# Patient Record
Sex: FEMALE | Race: WHITE | NOT HISPANIC OR LATINO | URBAN - METROPOLITAN AREA
[De-identification: names, ages, dates, MRNs, and addresses within clinical notes are randomized per-mention and may not be internally consistent; named-entity substitution may affect disease eponyms.]

---
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
## 2019-08-07 ENCOUNTER — RX RENEWAL (OUTPATIENT)
Age: 19
End: 2019-08-07

## 2019-08-08 ENCOUNTER — RX RENEWAL (OUTPATIENT)
Age: 19
End: 2019-08-08

## 2019-08-13 ENCOUNTER — RX RENEWAL (OUTPATIENT)
Age: 19
End: 2019-08-13

## 2019-12-16 ENCOUNTER — TRANSCRIPTION ENCOUNTER (OUTPATIENT)
Age: 19
End: 2019-12-16

## 2020-01-20 NOTE — ASU DISCHARGE PLAN (ADULT/PEDIATRIC). - ***IN THE EVENT THAT YOU DEVELOP A COMPLICATION AND YOU ARE UNABLE TO REACH YOUR OWN PHYSICIAN, YOU MAY CONTACT:
Statement Selected
pt transferreed to [address] [facility] infusing without difficulty pt transferred with nurse and transport

## 2020-06-26 ENCOUNTER — TRANSCRIPTION ENCOUNTER (OUTPATIENT)
Age: 20
End: 2020-06-26

## 2020-08-20 ENCOUNTER — TRANSCRIPTION ENCOUNTER (OUTPATIENT)
Age: 20
End: 2020-08-20

## 2020-09-15 ENCOUNTER — TRANSCRIPTION ENCOUNTER (OUTPATIENT)
Age: 20
End: 2020-09-15

## 2020-09-18 ENCOUNTER — TRANSCRIPTION ENCOUNTER (OUTPATIENT)
Age: 20
End: 2020-09-18

## 2020-10-13 PROBLEM — J35.01 CHRONIC TONSILLITIS: Chronic | Status: ACTIVE | Noted: 2017-06-20

## 2020-11-24 ENCOUNTER — APPOINTMENT (OUTPATIENT)
Dept: INTERNAL MEDICINE | Facility: CLINIC | Age: 20
End: 2020-11-24
Payer: COMMERCIAL

## 2020-11-24 VITALS
HEIGHT: 62 IN | WEIGHT: 122 LBS | RESPIRATION RATE: 17 BRPM | SYSTOLIC BLOOD PRESSURE: 120 MMHG | TEMPERATURE: 97.7 F | BODY MASS INDEX: 22.45 KG/M2 | DIASTOLIC BLOOD PRESSURE: 80 MMHG | HEART RATE: 72 BPM | OXYGEN SATURATION: 99 %

## 2020-11-24 DIAGNOSIS — K35.80 UNSPECIFIED ACUTE APPENDICITIS: ICD-10-CM

## 2020-11-24 DIAGNOSIS — J45.901 UNSPECIFIED ASTHMA WITH (ACUTE) EXACERBATION: ICD-10-CM

## 2020-11-24 DIAGNOSIS — Z83.2 FAMILY HISTORY OF DISEASES OF THE BLOOD AND BLOOD-FORMING ORGANS AND CERTAIN DISORDERS INVOLVING THE IMMUNE MECHANISM: ICD-10-CM

## 2020-11-24 PROCEDURE — G0442 ANNUAL ALCOHOL SCREEN 15 MIN: CPT

## 2020-11-24 PROCEDURE — 99385 PREV VISIT NEW AGE 18-39: CPT | Mod: 25

## 2020-11-24 PROCEDURE — G0444 DEPRESSION SCREEN ANNUAL: CPT

## 2020-11-24 RX ORDER — PANTOPRAZOLE 40 MG/1
40 TABLET, DELAYED RELEASE ORAL DAILY
Qty: 90 | Refills: 3 | Status: DISCONTINUED | COMMUNITY
Start: 2019-08-07 | End: 2020-11-24

## 2020-11-24 RX ORDER — HYOSCYAMINE SULFATE 0.12 MG/1
0.12 TABLET SUBLINGUAL EVERY 4 HOURS
Qty: 90 | Refills: 1 | Status: DISCONTINUED | COMMUNITY
Start: 2019-08-13 | End: 2020-11-24

## 2020-11-24 RX ORDER — HYOSCYAMINE SULFATE 0.12 MG/1
0.12 TABLET, ORALLY DISINTEGRATING ORAL
Qty: 90 | Refills: 3 | Status: DISCONTINUED | COMMUNITY
Start: 2019-08-08 | End: 2020-11-24

## 2020-11-24 RX ORDER — LEVONORGESTREL 19.5 MG/1
INTRAUTERINE DEVICE INTRAUTERINE
Refills: 0 | Status: ACTIVE | COMMUNITY

## 2020-11-25 ENCOUNTER — TRANSCRIPTION ENCOUNTER (OUTPATIENT)
Age: 20
End: 2020-11-25

## 2020-11-25 LAB
25(OH)D3 SERPL-MCNC: 40.3 NG/ML
ALBUMIN SERPL ELPH-MCNC: 4.7 G/DL
ALP BLD-CCNC: 57 U/L
ALT SERPL-CCNC: 16 U/L
ANION GAP SERPL CALC-SCNC: 11 MMOL/L
AST SERPL-CCNC: 17 U/L
BASOPHILS # BLD AUTO: 0.06 K/UL
BASOPHILS NFR BLD AUTO: 1.1 %
BILIRUB SERPL-MCNC: 0.3 MG/DL
BUN SERPL-MCNC: 18 MG/DL
CALCIUM SERPL-MCNC: 9.8 MG/DL
CHLORIDE SERPL-SCNC: 109 MMOL/L
CHOLEST SERPL-MCNC: 192 MG/DL
CO2 SERPL-SCNC: 20 MMOL/L
CREAT SERPL-MCNC: 0.92 MG/DL
EOSINOPHIL # BLD AUTO: 0.24 K/UL
EOSINOPHIL NFR BLD AUTO: 4.4 %
ESTIMATED AVERAGE GLUCOSE: 97 MG/DL
GLUCOSE SERPL-MCNC: 88 MG/DL
HBA1C MFR BLD HPLC: 5 %
HCT VFR BLD CALC: 42.3 %
HDLC SERPL-MCNC: 57 MG/DL
HGB BLD-MCNC: 13.7 G/DL
IMM GRANULOCYTES NFR BLD AUTO: 0.2 %
LDLC SERPL CALC-MCNC: 126 MG/DL
LYMPHOCYTES # BLD AUTO: 2.58 K/UL
LYMPHOCYTES NFR BLD AUTO: 47.2 %
MAN DIFF?: NORMAL
MCHC RBC-ENTMCNC: 30.9 PG
MCHC RBC-ENTMCNC: 32.4 GM/DL
MCV RBC AUTO: 95.5 FL
MONOCYTES # BLD AUTO: 0.46 K/UL
MONOCYTES NFR BLD AUTO: 8.4 %
NEUTROPHILS # BLD AUTO: 2.12 K/UL
NEUTROPHILS NFR BLD AUTO: 38.7 %
NONHDLC SERPL-MCNC: 135 MG/DL
PLATELET # BLD AUTO: 229 K/UL
POTASSIUM SERPL-SCNC: 4.6 MMOL/L
PROT SERPL-MCNC: 6.5 G/DL
RBC # BLD: 4.43 M/UL
RBC # FLD: 12.3 %
SODIUM SERPL-SCNC: 140 MMOL/L
TRIGL SERPL-MCNC: 47 MG/DL
TSH SERPL-ACNC: 1.43 UIU/ML
WBC # FLD AUTO: 5.47 K/UL

## 2020-12-01 ENCOUNTER — OUTPATIENT (OUTPATIENT)
Dept: OUTPATIENT SERVICES | Facility: HOSPITAL | Age: 20
LOS: 1 days | End: 2020-12-01
Payer: COMMERCIAL

## 2020-12-01 DIAGNOSIS — Z90.49 ACQUIRED ABSENCE OF OTHER SPECIFIED PARTS OF DIGESTIVE TRACT: Chronic | ICD-10-CM

## 2020-12-01 DIAGNOSIS — N13.70 VESICOURETERAL-REFLUX, UNSPECIFIED: Chronic | ICD-10-CM

## 2020-12-01 DIAGNOSIS — Z11.59 ENCOUNTER FOR SCREENING FOR OTHER VIRAL DISEASES: ICD-10-CM

## 2020-12-01 LAB — SARS-COV-2 RNA SPEC QL NAA+PROBE: SIGNIFICANT CHANGE UP

## 2020-12-01 PROCEDURE — U0003: CPT

## 2020-12-02 DIAGNOSIS — Z11.59 ENCOUNTER FOR SCREENING FOR OTHER VIRAL DISEASES: ICD-10-CM

## 2020-12-10 NOTE — ASU PATIENT PROFILE, ADULT - ATTEMPT TO OOB
"Subjective   Bobbi Ellison is a 29 y.o. female who presents today for follow up    Chief Complaint:  Insomnia, anxiety    History of Present Illness:  Bobbi is a 29-year-old single  female mother of one daughter.  She presents today for her one month follow-up for medication management.  She is clean, dressed in business casual attire.  She appears calm, good eye contact, engaged in appointment.    States everything is \"fine at home \". she is working at [employer] and enjoying her job.  She states \"life is good \", all the bad comes from the relationship with her boyfriend.  She and Shine have been dating for 3 years.  He verbally berates her and has been unfaithful multiple times.  He tells her \"you know something was wrong with you.\" Lorena acknowledges the relationship is not healthy and she verbalizes a desire to be out of the relationship.  When she mentions breaking up, Shine he goes into \"overdrive,\" starts coming over more and spending more time with her daughter.     Sleep is a little better.  She still has problems falling asleep due to \"not being able to get her mind to calm down \" She worries about various things but predominantly her relationship.  She denies nightmares.  Duration of sleep averages 6-7 hours.  Daniel states that she has been overeating daily and has gained about 40 pounds in the last year.The benefits  of a healthy diet and exercise were discussed with patient, especially the positive effects they have on mental health. Patient encouraged to consider lifestyle modification regarding  diet and exercise patterns to maximize results of mental health treatment. Bobbi states her mood is \"a little better.\" PHQ scale is 12.  Most problematic are overeating, poor self-esteem, and fatigue.  Her CHINYERE score was 13.  She finds frequent generalized anxiety,  and catastrophizing most impairing.  Her anxiety and depression make it very difficult for Bobbi to function day-to-day.  " Last visit, Bobbi's PHQ score was 9, she rated anxiety as 8/10.  Hopelessness was rated 5/10 both visits.  Patient states she notices a significant improvement in the OCD tendencies.  Some nights obsessions and compulsions to be 80% improved.  She notices exacerbations of obsessions/compulsions predominantly when she is upset.    Medication treatment options discussed with Lorena.  Potential risks, benefits, side effects discussed with patient.  Patient prefers to maintain current dose of Prozac at 20 mg.  She experiences no negative side effects.  Discussed options of adding BuSpar 5 mg p.o. twice daily for anxiety, or adding trazodone 25 mg p.o. at bedtime to augment for anxiety depression and insomnia.  Prefers adding trazodone 25 mg p.o. at at bedtime.  Propanolol discontinued due to nonuse.  Importance of adding psychotherapy to augment medication treatment and help manage interpersonal conflict.  Pt receptive and agrees to make appointment at checkout.  Lab results from October discussed with patient as well.T uptake mildly elevated at 1.37, MPV slightly elevated at 13.2 vitamin D level of 68.7.  Patient states she has been taking approximately 1000 MCG's of vitamin D daily to improve immune function.  Discussed that vitamin D augmentation can affect thyroid function however she is encouraged to find a PCP for health maintenance purposes    The following portions of the patient's history were reviewed and updated as appropriate: allergies, current medications, past family history, past medical history, past social history, past surgical history and problem list.    Allergy:   No Known Allergies     Current Medications:   Current Outpatient Medications   Medication Sig Dispense Refill   • FLUoxetine (PROzac) 20 MG capsule Take 1 capsule by mouth Daily for 30 days. 30 capsule 0   • norgestimate-ethinyl estradiol (Tri-Sprintec) 0.18/0.215/0.25 MG-35 MCG per tablet Take 1 tablet by mouth.     • propranolol  "(INDERAL) 10 MG tablet Take 1 tablet by mouth 2 (Two) Times a Day As Needed (anxiety) for up to 30 days. 60 tablet 2   • traZODone (DESYREL) 50 MG tablet Take 0.5 tablets by mouth Every Night. 30 tablet 0     No current facility-administered medications for this visit.      Review of Symptoms:    Review of Systems   Constitutional: Positive for activity change and appetite change.   Psychiatric/Behavioral: Positive for decreased concentration, sleep disturbance, depressed mood and stress. The patient is nervous/anxious.    All other systems reviewed and are negative.    Physical Exam:   Blood pressure 118/77, pulse 77, temperature 97.1 °F (36.2 °C), height 172.7 cm (67.99\"), weight 89.2 kg (196 lb 9.6 oz).  Body mass index is 29.9 kg/m².    Appearance: clean, dressed in business-casual  Gait, Station, Strength: posture erect, gait steady    Mental Status Exam:   Hygiene:   good  Cooperation:  Cooperative  Eye Contact:  Good  Psychomotor Behavior:  Appropriate  Affect:  Appropriate  Mood: normal  Hopelessness: 5  Speech:  Normal  Thought Process:  Goal directed  Thought Content:  Normal  Suicidal:  None  Homicidal:  None  Hallucinations:  None  Delusion:  None  Memory:  Intact  Orientation:  Person, Place, Time and Situation  Reliability:  good  Insight:  Poor  Judgement:  Poor  Impulse Control:  Good  Physical/Medical Issues:  No      PHQ-Score Total:  PHQ-9 Total Score: 12    Lab Results:   No visits with results within 1 Month(s) from this visit.   Latest known visit with results is:   Lab on 10/26/2020   Component Date Value Ref Range Status   • TSH 10/26/2020 1.520  0.270 - 4.200 uIU/mL Final   • T Uptake 10/26/2020 1.37* 0.80 - 1.30 TBI Final   • T4, Total 10/26/2020 8.93  4.50 - 11.70 mcg/dL Final    T4 results may be falsely increased if patient taking Biotin.   • WBC 10/26/2020 7.66  3.40 - 10.80 10*3/mm3 Final   • RBC 10/26/2020 3.89  3.77 - 5.28 10*6/mm3 Final   • Hemoglobin 10/26/2020 12.1  12.0 - 15.9 " g/dL Final   • Hematocrit 10/26/2020 37.6  34.0 - 46.6 % Final   • MCV 10/26/2020 96.7  79.0 - 97.0 fL Final   • MCH 10/26/2020 31.1  26.6 - 33.0 pg Final   • MCHC 10/26/2020 32.2  31.5 - 35.7 g/dL Final   • RDW 10/26/2020 12.8  12.3 - 15.4 % Final   • RDW-SD 10/26/2020 45.8  37.0 - 54.0 fl Final   • MPV 10/26/2020 13.2* 6.0 - 12.0 fL Final   • Platelets 10/26/2020 199  140 - 450 10*3/mm3 Final   • Neutrophil % 10/26/2020 67.4  42.7 - 76.0 % Final   • Lymphocyte % 10/26/2020 25.3  19.6 - 45.3 % Final   • Monocyte % 10/26/2020 5.9  5.0 - 12.0 % Final   • Eosinophil % 10/26/2020 0.9  0.3 - 6.2 % Final   • Basophil % 10/26/2020 0.4  0.0 - 1.5 % Final   • Neutrophils, Absolute 10/26/2020 5.16  1.70 - 7.00 10*3/mm3 Final   • Lymphocytes, Absolute 10/26/2020 1.94  0.70 - 3.10 10*3/mm3 Final   • Monocytes, Absolute 10/26/2020 0.45  0.10 - 0.90 10*3/mm3 Final   • Eosinophils, Absolute 10/26/2020 0.07  0.00 - 0.40 10*3/mm3 Final   • Basophils, Absolute 10/26/2020 0.03  0.00 - 0.20 10*3/mm3 Final     Assessment/Plan   Problems Addressed this Visit     None      Visit Diagnoses     CHINYERE (generalized anxiety disorder)    -  Primary    Relevant Medications    traZODone (DESYREL) 50 MG tablet    FLUoxetine (PROzac) 20 MG capsule    Mixed obsessional thoughts and acts        Relevant Medications    traZODone (DESYREL) 50 MG tablet    FLUoxetine (PROzac) 20 MG capsule    Current moderate episode of major depressive disorder, unspecified whether recurrent (CMS/HCC)        Relevant Medications    traZODone (DESYREL) 50 MG tablet    FLUoxetine (PROzac) 20 MG capsule    Insomnia due to other mental disorder        Relevant Medications    traZODone (DESYREL) 50 MG tablet    FLUoxetine (PROzac) 20 MG capsule    Medication management          Diagnoses       Codes Comments    CHINYERE (generalized anxiety disorder)    -  Primary ICD-10-CM: F41.1  ICD-9-CM: 300.02     Mixed obsessional thoughts and acts     ICD-10-CM: F42.2  ICD-9-CM: 300.3      Current moderate episode of major depressive disorder, unspecified whether recurrent (CMS/HCC)     ICD-10-CM: F32.1  ICD-9-CM: 296.22     Insomnia due to other mental disorder     ICD-10-CM: F51.05, F99  ICD-9-CM: 300.9, 327.02     Medication management     ICD-10-CM: Z79.899  ICD-9-CM: V58.69         Visit Diagnoses:    ICD-10-CM ICD-9-CM   1. CHINYERE (generalized anxiety disorder)  F41.1 300.02   2. Mixed obsessional thoughts and acts  F42.2 300.3   3. Current moderate episode of major depressive disorder, unspecified whether recurrent (CMS/HCC)  F32.1 296.22   4. Insomnia due to other mental disorder  F51.05 300.9   5. Medication management  Z79.899 V58.69     TREATMENT PLAN/GOALS:   Short Term  1. Pt will keep all appts for med mgt and psychotherapy  2. Pt will take medications as prescribed and report intolerable side effects  3. Pt will pursue psychotherapy services to help gain coping skill and process trauma    Long term:   1. Pt will exhibit emotional stability  2. Pt will use coping skills to work through depression  3. Pt will manage feeling of anger using anger management strategies    MEDICATION ISSUES:  Discussed medication options and treatment plan of prescribed medication as well as the risks, benefits, and side effects including potential falls, possible impaired driving and metabolic adversities among others. Patient is agreeable to call the office with any worsening of symptoms or onset of side effects. Patient is agreeable to call 911 or go to the nearest ER should he/she begin having SI/HI.     MEDS ORDERED DURING VISIT:  New Medications Ordered This Visit   Medications   • traZODone (DESYREL) 50 MG tablet     Sig: Take 0.5 tablets by mouth Every Night.     Dispense:  30 tablet     Refill:  0   • FLUoxetine (PROzac) 20 MG capsule     Sig: Take 1 capsule by mouth Daily for 30 days.     Dispense:  30 capsule     Refill:  0     Return in about 4 weeks (around 1/7/2021), or if symptoms worsen or fail  to improve.           This document has been electronically signed by EROS Jackson   December 10, 2020 09:42 EST    Part of this note may be an electronic transcription/translation of spoken language to printed text using the Dragon Dictation System.   no

## 2021-02-25 ENCOUNTER — OUTPATIENT (OUTPATIENT)
Dept: OUTPATIENT SERVICES | Facility: HOSPITAL | Age: 21
LOS: 1 days | End: 2021-02-25
Payer: COMMERCIAL

## 2021-02-25 DIAGNOSIS — Z90.49 ACQUIRED ABSENCE OF OTHER SPECIFIED PARTS OF DIGESTIVE TRACT: Chronic | ICD-10-CM

## 2021-02-25 DIAGNOSIS — N13.70 VESICOURETERAL-REFLUX, UNSPECIFIED: Chronic | ICD-10-CM

## 2021-02-25 DIAGNOSIS — Z20.828 CONTACT WITH AND (SUSPECTED) EXPOSURE TO OTHER VIRAL COMMUNICABLE DISEASES: ICD-10-CM

## 2021-02-25 LAB — SARS-COV-2 RNA SPEC QL NAA+PROBE: SIGNIFICANT CHANGE UP

## 2021-02-25 PROCEDURE — U0003: CPT

## 2021-02-25 PROCEDURE — U0005: CPT

## 2021-02-25 PROCEDURE — C9803: CPT

## 2021-02-26 DIAGNOSIS — Z20.828 CONTACT WITH AND (SUSPECTED) EXPOSURE TO OTHER VIRAL COMMUNICABLE DISEASES: ICD-10-CM

## 2022-01-27 ENCOUNTER — APPOINTMENT (OUTPATIENT)
Dept: INTERNAL MEDICINE | Facility: CLINIC | Age: 22
End: 2022-01-27
Payer: COMMERCIAL

## 2022-01-27 VITALS
BODY MASS INDEX: 23.55 KG/M2 | RESPIRATION RATE: 17 BRPM | WEIGHT: 128 LBS | SYSTOLIC BLOOD PRESSURE: 124 MMHG | HEART RATE: 81 BPM | HEIGHT: 62 IN | OXYGEN SATURATION: 99 % | TEMPERATURE: 98.2 F | DIASTOLIC BLOOD PRESSURE: 81 MMHG

## 2022-01-27 DIAGNOSIS — N83.209 UNSPECIFIED OVARIAN CYST, UNSPECIFIED SIDE: ICD-10-CM

## 2022-01-27 DIAGNOSIS — Z28.21 IMMUNIZATION NOT CARRIED OUT BECAUSE OF PATIENT REFUSAL: ICD-10-CM

## 2022-01-27 PROCEDURE — 99395 PREV VISIT EST AGE 18-39: CPT

## 2022-01-28 LAB
25(OH)D3 SERPL-MCNC: 51.5 NG/ML
ALBUMIN SERPL ELPH-MCNC: 5 G/DL
ALP BLD-CCNC: 71 U/L
ALT SERPL-CCNC: 16 U/L
ANION GAP SERPL CALC-SCNC: 15 MMOL/L
AST SERPL-CCNC: 17 U/L
BASOPHILS # BLD AUTO: 0.05 K/UL
BASOPHILS NFR BLD AUTO: 0.6 %
BILIRUB SERPL-MCNC: 0.8 MG/DL
BUN SERPL-MCNC: 11 MG/DL
CALCIUM SERPL-MCNC: 10.1 MG/DL
CHLORIDE SERPL-SCNC: 103 MMOL/L
CHOLEST SERPL-MCNC: 223 MG/DL
CO2 SERPL-SCNC: 22 MMOL/L
CREAT SERPL-MCNC: 0.9 MG/DL
EOSINOPHIL # BLD AUTO: 0.07 K/UL
EOSINOPHIL NFR BLD AUTO: 0.9 %
ESTIMATED AVERAGE GLUCOSE: 97 MG/DL
GLUCOSE SERPL-MCNC: 88 MG/DL
HBA1C MFR BLD HPLC: 5 %
HCT VFR BLD CALC: 43.9 %
HDLC SERPL-MCNC: 81 MG/DL
HGB BLD-MCNC: 14.4 G/DL
IMM GRANULOCYTES NFR BLD AUTO: 0.4 %
LDLC SERPL CALC-MCNC: 132 MG/DL
LYMPHOCYTES # BLD AUTO: 2.14 K/UL
LYMPHOCYTES NFR BLD AUTO: 26.1 %
MAN DIFF?: NORMAL
MCHC RBC-ENTMCNC: 31.3 PG
MCHC RBC-ENTMCNC: 32.8 GM/DL
MCV RBC AUTO: 95.4 FL
MONOCYTES # BLD AUTO: 0.51 K/UL
MONOCYTES NFR BLD AUTO: 6.2 %
NEUTROPHILS # BLD AUTO: 5.39 K/UL
NEUTROPHILS NFR BLD AUTO: 65.8 %
NONHDLC SERPL-MCNC: 142 MG/DL
PLATELET # BLD AUTO: 281 K/UL
POTASSIUM SERPL-SCNC: 4.1 MMOL/L
PROT SERPL-MCNC: 7.2 G/DL
RBC # BLD: 4.6 M/UL
RBC # FLD: 12.9 %
SODIUM SERPL-SCNC: 140 MMOL/L
TRIGL SERPL-MCNC: 48 MG/DL
TSH SERPL-ACNC: 1.06 UIU/ML
WBC # FLD AUTO: 8.19 K/UL

## 2022-07-08 ENCOUNTER — NON-APPOINTMENT (OUTPATIENT)
Age: 22
End: 2022-07-08

## 2022-08-06 ENCOUNTER — NON-APPOINTMENT (OUTPATIENT)
Age: 22
End: 2022-08-06

## 2022-11-01 NOTE — PRE-ANESTHESIA EVALUATION ADULT - HEART RATE (BEATS/MIN)
Impression: Vitreous degeneration, bilateral: H43.813. Plan: Mac OCT: unable OD, thinning OS. Retina flat & intact 360, no holes/breaks/tears. S/S of RD discussed, pt to RTC ASAP if occurs. Monitor. Repeat Mac OCT next visit. 69

## 2022-11-08 ENCOUNTER — NON-APPOINTMENT (OUTPATIENT)
Age: 22
End: 2022-11-08

## 2022-11-27 ENCOUNTER — NON-APPOINTMENT (OUTPATIENT)
Age: 22
End: 2022-11-27

## 2023-03-24 DIAGNOSIS — B00.2 HERPESVIRAL GINGIVOSTOMATITIS AND PHARYNGOTONSILLITIS: ICD-10-CM

## 2023-06-01 ENCOUNTER — LABORATORY RESULT (OUTPATIENT)
Age: 23
End: 2023-06-01

## 2023-06-01 ENCOUNTER — APPOINTMENT (OUTPATIENT)
Dept: INTERNAL MEDICINE | Facility: CLINIC | Age: 23
End: 2023-06-01
Payer: COMMERCIAL

## 2023-06-01 ENCOUNTER — NON-APPOINTMENT (OUTPATIENT)
Age: 23
End: 2023-06-01

## 2023-06-01 VITALS
BODY MASS INDEX: 24.29 KG/M2 | TEMPERATURE: 97.3 F | DIASTOLIC BLOOD PRESSURE: 77 MMHG | HEIGHT: 62 IN | OXYGEN SATURATION: 99 % | WEIGHT: 132 LBS | RESPIRATION RATE: 17 BRPM | HEART RATE: 74 BPM | SYSTOLIC BLOOD PRESSURE: 132 MMHG

## 2023-06-01 DIAGNOSIS — R19.4 CHANGE IN BOWEL HABIT: ICD-10-CM

## 2023-06-01 DIAGNOSIS — R53.82 CHRONIC FATIGUE, UNSPECIFIED: ICD-10-CM

## 2023-06-01 DIAGNOSIS — B97.7 PAPILLOMAVIRUS AS THE CAUSE OF DISEASES CLASSIFIED ELSEWHERE: ICD-10-CM

## 2023-06-01 DIAGNOSIS — R76.8 OTHER SPECIFIED ABNORMAL IMMUNOLOGICAL FINDINGS IN SERUM: ICD-10-CM

## 2023-06-01 DIAGNOSIS — E78.5 HYPERLIPIDEMIA, UNSPECIFIED: ICD-10-CM

## 2023-06-01 DIAGNOSIS — J45.909 UNSPECIFIED ASTHMA, UNCOMPLICATED: ICD-10-CM

## 2023-06-01 DIAGNOSIS — K58.9 IRRITABLE BOWEL SYNDROME W/OUT DIARRHEA: ICD-10-CM

## 2023-06-01 DIAGNOSIS — Z00.00 ENCOUNTER FOR GENERAL ADULT MEDICAL EXAMINATION W/OUT ABNORMAL FINDINGS: ICD-10-CM

## 2023-06-01 DIAGNOSIS — D68.51 ACTIVATED PROTEIN C RESISTANCE: ICD-10-CM

## 2023-06-01 PROCEDURE — 99214 OFFICE O/P EST MOD 30 MIN: CPT | Mod: 25

## 2023-06-01 PROCEDURE — 99395 PREV VISIT EST AGE 18-39: CPT | Mod: 25

## 2023-06-01 RX ORDER — ALBUTEROL SULFATE 90 UG/1
108 (90 BASE) INHALANT RESPIRATORY (INHALATION)
Qty: 1 | Refills: 2 | Status: ACTIVE | COMMUNITY
Start: 1900-01-01 | End: 1900-01-01

## 2023-06-02 DIAGNOSIS — R79.0 ABNORMAL LVL OF BLOOD MINERAL: ICD-10-CM

## 2023-06-02 LAB
25(OH)D3 SERPL-MCNC: 43 NG/ML
ALBUMIN SERPL ELPH-MCNC: 4.6 G/DL
ALP BLD-CCNC: 60 U/L
ALT SERPL-CCNC: 15 U/L
ANA PAT FLD IF-IMP: NORMAL
ANA SER IF-ACNC: ABNORMAL
ANION GAP SERPL CALC-SCNC: 13 MMOL/L
APPEARANCE: CLEAR
AST SERPL-CCNC: 19 U/L
BILIRUB SERPL-MCNC: 1.1 MG/DL
BILIRUBIN URINE: NEGATIVE
BLOOD URINE: NEGATIVE
BUN SERPL-MCNC: 12 MG/DL
CALCIUM SERPL-MCNC: 10.1 MG/DL
CHLORIDE SERPL-SCNC: 103 MMOL/L
CHOLEST SERPL-MCNC: 187 MG/DL
CO2 SERPL-SCNC: 23 MMOL/L
COLOR: YELLOW
CREAT SERPL-MCNC: 0.96 MG/DL
CRP SERPL-MCNC: <3 MG/L
EGFR: 85 ML/MIN/1.73M2
ERYTHROCYTE [SEDIMENTATION RATE] IN BLOOD BY WESTERGREN METHOD: 2 MM/HR
ESTIMATED AVERAGE GLUCOSE: 103 MG/DL
FERRITIN SERPL-MCNC: 87 NG/ML
FOLATE SERPL-MCNC: >20 NG/ML
GLUCOSE QUALITATIVE U: NEGATIVE MG/DL
GLUCOSE SERPL-MCNC: 76 MG/DL
HBA1C MFR BLD HPLC: 5.2 %
HDLC SERPL-MCNC: 71 MG/DL
IRON SATN MFR SERPL: 65 %
IRON SERPL-MCNC: 200 UG/DL
KETONES URINE: NEGATIVE MG/DL
LDLC SERPL CALC-MCNC: 106 MG/DL
LEUKOCYTE ESTERASE URINE: ABNORMAL
NITRITE URINE: NEGATIVE
NONHDLC SERPL-MCNC: 116 MG/DL
PH URINE: 7
POTASSIUM SERPL-SCNC: 4 MMOL/L
PROT SERPL-MCNC: 6.8 G/DL
PROTEIN URINE: NEGATIVE MG/DL
SODIUM SERPL-SCNC: 139 MMOL/L
SPECIFIC GRAVITY URINE: 1.01
TIBC SERPL-MCNC: 307 UG/DL
TRIGL SERPL-MCNC: 50 MG/DL
TSH SERPL-ACNC: 0.66 UIU/ML
UIBC SERPL-MCNC: 107 UG/DL
UROBILINOGEN URINE: 0.2 MG/DL
VIT B12 SERPL-MCNC: 374 PG/ML

## 2023-06-12 ENCOUNTER — APPOINTMENT (OUTPATIENT)
Dept: CARDIOLOGY | Facility: CLINIC | Age: 23
End: 2023-06-12
Payer: COMMERCIAL

## 2023-06-12 ENCOUNTER — NON-APPOINTMENT (OUTPATIENT)
Age: 23
End: 2023-06-12

## 2023-06-12 VITALS
HEART RATE: 71 BPM | BODY MASS INDEX: 23.92 KG/M2 | HEIGHT: 62 IN | SYSTOLIC BLOOD PRESSURE: 112 MMHG | DIASTOLIC BLOOD PRESSURE: 80 MMHG | OXYGEN SATURATION: 99 % | WEIGHT: 130 LBS

## 2023-06-12 DIAGNOSIS — R00.0 TACHYCARDIA, UNSPECIFIED: ICD-10-CM

## 2023-06-12 DIAGNOSIS — R00.2 PALPITATIONS: ICD-10-CM

## 2023-06-12 PROCEDURE — 99204 OFFICE O/P NEW MOD 45 MIN: CPT

## 2023-06-12 PROCEDURE — 93242 EXT ECG>48HR<7D RECORDING: CPT

## 2023-06-12 PROCEDURE — 93000 ELECTROCARDIOGRAM COMPLETE: CPT | Mod: 59

## 2023-06-12 PROCEDURE — ZZZZZ: CPT

## 2023-06-12 NOTE — PHYSICAL EXAM

## 2023-06-12 NOTE — DISCUSSION/SUMMARY
[FreeTextEntry1] : 3-day cardiac monitor to eval for arrhythmia vs ST.\par Reassurance.\par Discussed potential contributors to tachycardia incl lack of adequate hydration, caffeine, and bronchodilator.\par

## 2023-06-12 NOTE — HISTORY OF PRESENT ILLNESS
[FreeTextEntry1] : CLARA WALLACE is a 22 year old female referred for consultation due to tachycardia to over 200 bpm during exercise.  She trains as a distance runner and has noted on her smartwatch that her HR has been routinely over 200 at less than maximal intensity exercise.  No assoc symptoms of chest pain, SOB, or dizziness.  She reports the HR escalation is sometimes abrupt, and not every time she exericses.  She also has had palpitations during these episodes.\par Recent labs reviewed.\par \par She has hist of exercise-induced asthma/allergies and uses Albuterol inhaler pre-exercise.\par \par Soc - no smoking.  + caffeine.\par \par Fam hist - + Factor V leiden mutation.  No early CAD. No SCD.

## 2023-08-01 RX ORDER — VALACYCLOVIR 1 G/1
1 TABLET, FILM COATED ORAL
Qty: 20 | Refills: 3 | Status: ACTIVE | COMMUNITY
Start: 2023-07-19 | End: 1900-01-01

## 2024-04-02 ENCOUNTER — EMERGENCY (EMERGENCY)
Facility: HOSPITAL | Age: 24
LOS: 1 days | Discharge: HOME | End: 2024-04-02
Attending: EMERGENCY MEDICINE | Admitting: EMERGENCY MEDICINE
Payer: COMMERCIAL

## 2024-04-02 VITALS
OXYGEN SATURATION: 98 % | HEART RATE: 97 BPM | SYSTOLIC BLOOD PRESSURE: 157 MMHG | TEMPERATURE: 98 F | DIASTOLIC BLOOD PRESSURE: 89 MMHG | RESPIRATION RATE: 18 BRPM | WEIGHT: 130.07 LBS

## 2024-04-02 DIAGNOSIS — N13.70 VESICOURETERAL-REFLUX, UNSPECIFIED: Chronic | ICD-10-CM

## 2024-04-02 DIAGNOSIS — Z90.49 ACQUIRED ABSENCE OF OTHER SPECIFIED PARTS OF DIGESTIVE TRACT: Chronic | ICD-10-CM

## 2024-04-02 PROCEDURE — 99291 CRITICAL CARE FIRST HOUR: CPT

## 2024-04-02 RX ORDER — EPINEPHRINE 0.3 MG/.3ML
0.3 INJECTION INTRAMUSCULAR; SUBCUTANEOUS
Qty: 1 | Refills: 0
Start: 2024-04-02 | End: 2024-04-02

## 2024-04-02 RX ORDER — FAMOTIDINE 10 MG/ML
20 INJECTION INTRAVENOUS ONCE
Refills: 0 | Status: COMPLETED | OUTPATIENT
Start: 2024-04-02 | End: 2024-04-02

## 2024-04-02 RX ORDER — DIPHENHYDRAMINE HCL 50 MG
25 CAPSULE ORAL ONCE
Refills: 0 | Status: COMPLETED | OUTPATIENT
Start: 2024-04-02 | End: 2024-04-02

## 2024-04-02 RX ORDER — EPINEPHRINE 0.3 MG/.3ML
0.3 INJECTION INTRAMUSCULAR; SUBCUTANEOUS
Qty: 2 | Refills: 0
Start: 2024-04-02 | End: 2024-04-02

## 2024-04-02 RX ORDER — DEXAMETHASONE 0.5 MG/5ML
6 ELIXIR ORAL ONCE
Refills: 0 | Status: COMPLETED | OUTPATIENT
Start: 2024-04-02 | End: 2024-04-02

## 2024-04-02 RX ORDER — SODIUM CHLORIDE 9 MG/ML
1000 INJECTION INTRAMUSCULAR; INTRAVENOUS; SUBCUTANEOUS ONCE
Refills: 0 | Status: COMPLETED | OUTPATIENT
Start: 2024-04-02 | End: 2024-04-02

## 2024-04-02 RX ADMIN — Medication 6 MILLIGRAM(S): at 18:41

## 2024-04-02 RX ADMIN — Medication 25 MILLIGRAM(S): at 18:41

## 2024-04-02 RX ADMIN — SODIUM CHLORIDE 1000 MILLILITER(S): 9 INJECTION INTRAMUSCULAR; INTRAVENOUS; SUBCUTANEOUS at 18:41

## 2024-04-02 RX ADMIN — FAMOTIDINE 20 MILLIGRAM(S): 10 INJECTION INTRAVENOUS at 18:41

## 2024-04-02 NOTE — ED PROVIDER NOTE - PHYSICAL EXAMINATION
General: well developed, well nourished, no distress  Eyes: no scleral injection  ENT:Lip swelling appreciated.  Oropharynx is clear.  Neck: non-tender, full range of motion, supple.   Respiratory: Clear to auscultation bilaterally, no wheezing noted.  Cardiovascular: no central cyanosis  Musculoskeletal: normal gait.   Extremities: normal range of motion, non-tender.  Neurologic: alert, oriented to person, oriented to place, oriented to time.    Skin: normal color.  Negative For: rash  Psychiatric: normal affect, normal insight, normal concentration

## 2024-04-02 NOTE — ED CDU PROVIDER DISPOSITION NOTE - NSFOLLOWUPINSTRUCTIONS_ED_ALL_ED_FT
Take prednisone 20mg twice a day, Zyrtec 10mg once a day and Pepcid 10mg twice a day for three days.  Make an appointment with your primary care or the allergist within a week.    Anaphylactic Reaction, Adult  An anaphylactic reaction (anaphylaxis) is a sudden and severe allergic reaction. It must be treated right away. You will need to go to the emergency room.    What are the causes?  This type of reaction happens when you are exposed to something you are allergic to (allergen). Your body makes antibodies to fight the allergen. It also makes a compound called histamine. This causes parts of your body to swell. It can also cause loss of blood pressure to areas like the heart and lungs.    Allergens that can cause this type of reaction include:  Foods, such as peanuts, wheat, shellfish, milk, or eggs.  Medicines.  Insect bites or stings.  Blood or parts of blood received for treatment (transfusions).  Chemicals. These include dyes, latex, and the contrast material used for medical tests.  What increases the risk?  You are more likely to have this kind of reaction if:  You have allergies.  You have had an anaphylactic reaction before.  You have a family history of anaphylactic reaction.  You have certain conditions. These include asthma and eczema.  What are the signs or symptoms?  Symptoms of this condition may include:  Feeling warm in the face (flushed). Your face may turn red.  Hives. These are itchy, red, swollen areas of skin.  Swelling of the eyes, lips, face, mouth, tongue, or throat.  Trouble breathing, speaking, or swallowing.  Making high-pitched whistling sounds when you breathe, most often when you breathe out (wheezing).  Feeling dizzy or light-headed, or fainting.  Pain or cramping in the abdomen.  Vomiting or diarrhea.  How is this diagnosed?  This condition is diagnosed based on:  Your symptoms.  A physical exam.  Blood tests.  When you were last exposed to an allergen.  How is this treated?  A person using an epinephrine auto-injector in the thigh.  If you think you are having an anaphylactic reaction, you should:  Give yourself an emergency shot of epinephrine using a device filled with medicine (auto-injector pen). Your health care provider will teach you how to use the auto-injector pen.  Call for help. If you use an auto-injector pen, you must still get treated in the hospital. You may be given:  Medicines to help:  Tighten your blood vessels (epinephrine).  Relieve itching and hives (antihistamines).  Reduce swelling (corticosteroids).  Oxygen therapy to help you breathe.  IV fluids to keep enough water in your body.  Follow these instructions at home:  Safety    Always keep an auto-injector pen near you. Use it as told by your provider.  Do not drive after you have an anaphylactic reaction. Wait until your provider says that you can.  Make sure that you, the people in your household, and your employer know:  What you are allergic to.  How to use an auto-injector pen.  Replace the epinephrine right after you use your auto-injector pen. If you can, carry two pens.  If told by your provider, wear an alert bracelet or necklace that states your allergy.  Learn the signs of anaphylactic reaction.  Work with your health care team to make a plan for how to handle an anaphylactic reaction.  General instructions    Take over-the-counter and prescription medicines only as told by your provider.  If you have hives or a rash:  Use an over-the-counter allergy medicine (antihistamine) as told by your provider.  Apply cold, wet cloths (cold compresses) to your skin. Take baths or showers in cool water. Do not use hot water.  Tell your team that you have an allergy.  How is this prevented?  Avoid allergens.  When you are at a restaurant, tell your  that you have an allergy. If you are not sure if a menu item contains a food that you are allergic to, ask your .  Where to find more information  American Academy of Allergy, Asthma, and Immunology (AAAAI): aaaai.org  Contact a health care provider if:  Your auto-injector pen has .  Get help right away if:  You have symptoms of an allergic reaction.  You use an auto-injector pen. You will need more medical care even if the medicine seems to be working. An anaphylactic reaction may happen again within 72 hours (rebound anaphylaxis).  These symptoms may be an emergency. Use your auto-injector pen right away. Then call 911.  Do not wait to see if the symptoms will go away.  Do not drive yourself to the hospital.  This information is not intended to replace advice given to you by your health care provider. Make sure you discuss any questions you have with your health care provider.    Document Revised: 2023 Document Reviewed: 2023  Elsevier Patient Education ©  [employer]

## 2024-04-02 NOTE — ED ADULT TRIAGE NOTE - CHIEF COMPLAINT QUOTE
here for allergic reaction s/p eating a snack. self medicated with Epi 0.3 Epi IM and 50 mg of Benadryl - lip swelling noted

## 2024-04-02 NOTE — ED PROVIDER NOTE - CLINICAL SUMMARY MEDICAL DECISION MAKING FREE TEXT BOX
23-year-old female presents this emergency room for allergic reaction that required epinephrine and Benadryl  Last understanding and physical exam shows swelling in the lower lip, and some swelling in the face.  However oropharynx is clear and there is no extraneous lung sounds  Patient will receive Decadron, Pepcid, Benadryl and IV fluids  Will observe patient for 3 hours to evaluate for rebound reaction.

## 2024-04-02 NOTE — ED CDU PROVIDER INITIAL DAY NOTE - OBJECTIVE STATEMENT
Referral complete and faxed to Dr Espino office, called and spoke to pt and informed.    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  Patient states that she ate a cookie at work that was individually wrapped, and she read the ingredients and there were nothing that she was allergic to in them so she had a bite.  Patient immediately felt tingling in her mouth, so she took 50 mg of Benadryl p.o.  She then exited work, and about 2 hours later she was at a sports massage when she started having the swelling of her lips and face.  Patient immediately took her EpiPen.  And 911 was called.  Patient arrived via EMS stating that she no longer felt like her throat was closing.

## 2024-04-02 NOTE — ED CDU PROVIDER DISPOSITION NOTE - CARE PROVIDER_API CALL
Doug Nguyen  Allergy and Immunology  [address] [place] [address], [address] [place] [address]  Phone: [phone]  Fax: [phone]  Follow Up Time:

## 2024-04-02 NOTE — ED CDU PROVIDER INITIAL DAY NOTE - NS_EDPROVIDERDISPOUSERTYPE_ED_A_ED
I have personally evaluated and examined the patient. The Attending was available to me as a supervising provider if needed. Attending Attestation (For Attendings USE Only)... on unit

## 2024-04-02 NOTE — ED ADULT NURSE NOTE - NS ED NURSE LEVEL OF CONSCIOUSNESS AFFECT
Pendulum exercise at least 4 times a day as demonstrated  Stretching as discussed, gradually progress stretching frequency and intensity  May use heat for circulation especially before stretching but may use ice for reducing inflammation in between stretching sections  Avoid nonsteroidal anti-inflammatories like Advil or Aleve while on prednisone      Muscle Strain, Ambulatory Care   GENERAL INFORMATION:   A muscle strain  is a twist, pull, or tear of a muscle or tendon  A tendon is a strong elastic tissue that connects a muscle to a bone  Common symptoms include the following:   · Bruised skin on the area of your injured muscle    · Muscle soreness, cramps, or spasms    · Little or stiff muscle movement, or loss of muscle strength    · Swelling in the area of the injury    · Muscle pain that gets worse with activity, or pain that moves or spreads to another body area    · Crepitus (crackling sound or grating feeling) when you move your muscle  Seek immediate care for the following symptoms:   · Sudden loss of feeling or movement in your injured muscle    Treatment for a muscle strain  may include any of the following:  · NSAIDs  help decrease swelling and pain or fever  This medicine is available with or without a doctor's order  NSAIDs can cause stomach bleeding or kidney problems in certain people  If you take blood thinner medicine, always ask your healthcare provider if NSAIDs are safe for you  Always read the medicine label and follow directions  · Muscle relaxers  help decrease pain and muscle spasms, and relax your muscles  · Steroid medicine  may be given to decrease pain and inflammation  · Local anesthetic  is medicine used to numb the area for a short time  This is often used if you have a muscle strain in your back  · Physical therapy exercises  may help improve movement and decrease pain   Physical therapy can also help improve strength and decrease your risk for loss of function  · Surgery  may be needed if your muscle strain does not heal after 6 months of treatment  Surgery may be done to drain blood that has pooled in your muscle  If your tendon was torn off of the bone, it may be put back with surgery  Manage your symptoms:   · Rest your muscle  to allow your injury to heal  When the pain decreases, begin normal, slow movements  For mild and moderate muscle strains, rest your muscles for about 2 days  Rest for 10 to 14 days if you have a severe muscle strain  You may need to use crutches if your muscle strain is in your legs  · Apply ice on your injured area  for 15 to 20 minutes every hour or as directed  Use an ice pack, or put crushed ice in a plastic bag  Cover it with a towel  Ice helps prevent tissue damage and decreases swelling and pain  · Use an elastic bandage as directed  Wrap the bandage around the area to decrease swelling  It should be snug, but not too tight  · Elevate your injured muscle  above the level of your heart as often as you can  This will help decrease swelling and pain  Prop your injured muscle on pillows or blankets to keep it elevated comfortably  · Stretch and strengthen your muscles each day  Stretch for about 30 seconds, 4 times a day  Stretch the muscle until you feel a slight pull  Stop stretching if you feel pain  Start to exercise and strengthen your muscles slowly  Increase the time and amount you exercise  Prevent another muscle strain:   · Always wear proper shoes when you play sports  Replace your old running shoes with new ones often if you are a runner  Use shoe inserts or arch supports to correct leg or foot problems  Ask your healthcare provider for more information on shoe supports  · Do warm up and cool down exercises  Do stretching exercises before you work out or do sports activities  These exercises will help loosen and decrease stress on your muscles  Cool down and stretch after your workout   Do not stop and rest after a workout without cooling down first            · Keep your muscles strong with strength training exercises  Exercises such as weight lifting and stretching exercises help keep your muscles flexible and strong  A physical therapist or  may help you with these exercises  · Slowly start your exercise or sports training program   Follow your healthcare provider's directions on when to start exercising  Slowly increase time, distance, and how often you train  Sudden increases in how often you train may cause you to injure your muscle again  Follow up with your healthcare provider as directed:  Write down your questions so you remember to ask them during your visits  CARE AGREEMENT:   You have the right to help plan your care  Learn about your health condition and how it may be treated  Discuss treatment options with your caregivers to decide what care you want to receive  You always have the right to refuse treatment  The above information is an  only  It is not intended as medical advice for individual conditions or treatments  Talk to your doctor, nurse or pharmacist before following any medical regimen to see if it is safe and effective for you  © 2014 [address] is for End User's use only and may not be sold, redistributed or otherwise used for commercial purposes  All illustrations and images included in CareNotes® are the copyrighted property of [employer]  or Lucien Collins  [employer]

## 2024-04-02 NOTE — ED CDU PROVIDER DISPOSITION NOTE - PATIENT PORTAL LINK FT
You can access the FollowMyHealth Patient Portal offered by [facility] by registering at the following website: [email or web address]. By joining [facility]’s FollowMyHealth portal, you will also be able to view your health information using other applications (apps) compatible with our system.

## 2024-04-02 NOTE — ED ADULT NURSE NOTE - OBJECTIVE STATEMENT
Patient is a 24 y/o F c/o allergic reaction. Patient reports allergic reaction prior to arrival. patient reports allergies to peanuts, pistachios, and cashews. Patient has angioedema around lips and hives on stomach and chest. Pt reports eating individually wrapped cookies at work. Patient reports taking 25mg benadryl. and self administered epi pen.

## 2024-04-02 NOTE — ED PROVIDER NOTE - OBJECTIVE STATEMENT
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  Patient states that she ate a cookie at work that was individually wrapped, and she read the ingredients and there were nothing that she was allergic to in them so she had a bite.  Patient immediately felt tingling in her mouth, so she took 50 mg of Benadryl p.o.  She then exited work, and about 2 hours later she was at a sports massage when she started having the swelling of her lips and face.  Patient immediately took her EpiPen.  And 911 was called.  Patient arrived via EMS stating that she no longer felt like her throat was closing.

## 2024-04-02 NOTE — ED PROVIDER NOTE - ATTENDING APP SHARED VISIT CONTRIBUTION OF CARE
Patient took her own Epi prior to EMS and took 50mg diphenhydramine, steroid and Pepcid added in ED.  Improving
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
## 2024-04-03 DIAGNOSIS — Z91.018 ALLERGY TO OTHER FOODS: ICD-10-CM

## 2024-04-03 DIAGNOSIS — T78.09XA ANAPHYLACTIC REACTION DUE TO OTHER FOOD PRODUCTS, INITIAL ENCOUNTER: ICD-10-CM

## 2024-04-03 DIAGNOSIS — R22.0 LOCALIZED SWELLING, MASS AND LUMP, HEAD: ICD-10-CM

## 2024-04-03 DIAGNOSIS — D68.51 ACTIVATED PROTEIN C RESISTANCE: ICD-10-CM
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
## 2024-05-22 ENCOUNTER — NON-APPOINTMENT (OUTPATIENT)
Age: 24
End: 2024-05-22

## 2024-06-04 PROBLEM — Z78.9 OTHER SPECIFIED HEALTH STATUS: Chronic | Status: ACTIVE | Noted: 2024-04-02

## 2024-09-19 ENCOUNTER — APPOINTMENT (OUTPATIENT)
Dept: INTERNAL MEDICINE | Facility: CLINIC | Age: 24
End: 2024-09-19

## 2024-09-19 VITALS
SYSTOLIC BLOOD PRESSURE: 117 MMHG | BODY MASS INDEX: 24.84 KG/M2 | TEMPERATURE: 97.6 F | WEIGHT: 135 LBS | HEART RATE: 78 BPM | RESPIRATION RATE: 17 BRPM | OXYGEN SATURATION: 100 % | HEIGHT: 62 IN | DIASTOLIC BLOOD PRESSURE: 77 MMHG

## 2024-09-19 DIAGNOSIS — Z23 ENCOUNTER FOR IMMUNIZATION: ICD-10-CM

## 2024-09-19 DIAGNOSIS — J45.909 UNSPECIFIED ASTHMA, UNCOMPLICATED: ICD-10-CM

## 2024-09-19 DIAGNOSIS — D68.51 ACTIVATED PROTEIN C RESISTANCE: ICD-10-CM

## 2024-09-19 DIAGNOSIS — K58.9 IRRITABLE BOWEL SYNDROME W/OUT DIARRHEA: ICD-10-CM

## 2024-09-19 DIAGNOSIS — Z00.00 ENCOUNTER FOR GENERAL ADULT MEDICAL EXAMINATION W/OUT ABNORMAL FINDINGS: ICD-10-CM

## 2024-09-19 DIAGNOSIS — R79.0 ABNORMAL LVL OF BLOOD MINERAL: ICD-10-CM

## 2024-09-19 DIAGNOSIS — R76.8 OTHER SPECIFIED ABNORMAL IMMUNOLOGICAL FINDINGS IN SERUM: ICD-10-CM

## 2024-09-19 DIAGNOSIS — E78.5 HYPERLIPIDEMIA, UNSPECIFIED: ICD-10-CM

## 2024-09-19 DIAGNOSIS — Z91.018 ALLERGY TO OTHER FOODS: ICD-10-CM

## 2024-09-19 PROCEDURE — G0008: CPT

## 2024-09-19 PROCEDURE — 90656 IIV3 VACC NO PRSV 0.5 ML IM: CPT

## 2024-09-19 PROCEDURE — 99395 PREV VISIT EST AGE 18-39: CPT | Mod: 25

## 2024-09-19 RX ORDER — EPINEPHRINE 0.3 MG/.3ML
INJECTION INTRAMUSCULAR
Refills: 0 | Status: ACTIVE | COMMUNITY

## 2024-09-19 RX ORDER — LEVONORGESTREL 52 MG/1
INTRAUTERINE DEVICE INTRAUTERINE
Refills: 0 | Status: ACTIVE | COMMUNITY

## 2024-09-20 LAB
25(OH)D3 SERPL-MCNC: 46.5 NG/ML
ALBUMIN SERPL ELPH-MCNC: 4.6 G/DL
ALP BLD-CCNC: 57 U/L
ALT SERPL-CCNC: 18 U/L
ANION GAP SERPL CALC-SCNC: 10 MMOL/L
APPEARANCE: CLEAR
AST SERPL-CCNC: 19 U/L
BASOPHILS # BLD AUTO: 0.06 K/UL
BASOPHILS NFR BLD AUTO: 0.6 %
BILIRUB SERPL-MCNC: 0.9 MG/DL
BILIRUBIN URINE: NEGATIVE
BLOOD URINE: NEGATIVE
BUN SERPL-MCNC: 18 MG/DL
CALCIUM SERPL-MCNC: 10.1 MG/DL
CHLORIDE SERPL-SCNC: 104 MMOL/L
CO2 SERPL-SCNC: 26 MMOL/L
COLOR: YELLOW
CREAT SERPL-MCNC: 0.99 MG/DL
EGFR: 81 ML/MIN/1.73M2
EOSINOPHIL # BLD AUTO: 0.13 K/UL
EOSINOPHIL NFR BLD AUTO: 1.4 %
FERRITIN SERPL-MCNC: 28 NG/ML
FOLATE SERPL-MCNC: 17.1 NG/ML
GLUCOSE QUALITATIVE U: NEGATIVE MG/DL
GLUCOSE SERPL-MCNC: 97 MG/DL
HCT VFR BLD CALC: 39.7 %
HGB BLD-MCNC: 12.8 G/DL
IMM GRANULOCYTES NFR BLD AUTO: 0.2 %
IRON SATN MFR SERPL: 39 %
IRON SERPL-MCNC: 114 UG/DL
KETONES URINE: NEGATIVE MG/DL
LEUKOCYTE ESTERASE URINE: NEGATIVE
LYMPHOCYTES # BLD AUTO: 2.81 K/UL
LYMPHOCYTES NFR BLD AUTO: 30 %
MAN DIFF?: NORMAL
MCHC RBC-ENTMCNC: 30.9 PG
MCHC RBC-ENTMCNC: 32.2 GM/DL
MCV RBC AUTO: 95.9 FL
MONOCYTES # BLD AUTO: 0.69 K/UL
MONOCYTES NFR BLD AUTO: 7.4 %
NEUTROPHILS # BLD AUTO: 5.65 K/UL
NEUTROPHILS NFR BLD AUTO: 60.4 %
NITRITE URINE: NEGATIVE
PH URINE: 5.5
PLATELET # BLD AUTO: 246 K/UL
POTASSIUM SERPL-SCNC: 3.6 MMOL/L
PROT SERPL-MCNC: 6.8 G/DL
PROTEIN URINE: NEGATIVE MG/DL
RBC # BLD: 4.14 M/UL
RBC # FLD: 13.7 %
SODIUM SERPL-SCNC: 140 MMOL/L
SPECIFIC GRAVITY URINE: 1.03
TIBC SERPL-MCNC: 293 UG/DL
TSH SERPL-ACNC: 0.67 UIU/ML
UIBC SERPL-MCNC: 179 UG/DL
UROBILINOGEN URINE: 0.2 MG/DL
VIT B12 SERPL-MCNC: 486 PG/ML
WBC # FLD AUTO: 9.36 K/UL

## 2024-09-24 LAB
ANA PAT FLD IF-IMP: NORMAL
ANA SER IF-ACNC: ABNORMAL

## 2024-12-02 ENCOUNTER — TRANSCRIPTION ENCOUNTER (OUTPATIENT)
Age: 24
End: 2024-12-02

## 2025-02-05 ENCOUNTER — NON-APPOINTMENT (OUTPATIENT)
Age: 25
End: 2025-02-05

## 2025-04-09 ENCOUNTER — NON-APPOINTMENT (OUTPATIENT)
Age: 25
End: 2025-04-09

## 2025-07-23 ENCOUNTER — NON-APPOINTMENT (OUTPATIENT)
Age: 25
End: 2025-07-23

## 2025-07-23 ENCOUNTER — APPOINTMENT (OUTPATIENT)
Dept: CARDIOLOGY | Facility: CLINIC | Age: 25
End: 2025-07-23
Payer: COMMERCIAL

## 2025-07-23 VITALS
WEIGHT: 130 LBS | OXYGEN SATURATION: 98 % | BODY MASS INDEX: 22.2 KG/M2 | HEART RATE: 67 BPM | SYSTOLIC BLOOD PRESSURE: 110 MMHG | HEIGHT: 64 IN | DIASTOLIC BLOOD PRESSURE: 80 MMHG

## 2025-07-23 DIAGNOSIS — R07.89 OTHER CHEST PAIN: ICD-10-CM

## 2025-07-23 DIAGNOSIS — R06.09 OTHER FORMS OF DYSPNEA: ICD-10-CM

## 2025-07-23 DIAGNOSIS — R01.1 CARDIAC MURMUR, UNSPECIFIED: ICD-10-CM

## 2025-07-23 PROCEDURE — 93000 ELECTROCARDIOGRAM COMPLETE: CPT

## 2025-07-23 PROCEDURE — 99214 OFFICE O/P EST MOD 30 MIN: CPT | Mod: 25

## 2025-07-25 ENCOUNTER — TRANSCRIPTION ENCOUNTER (OUTPATIENT)
Age: 25
End: 2025-07-25

## 2025-07-25 LAB
DEPRECATED D DIMER PPP IA-ACNC: <150 NG/ML DDU
HCT VFR BLD CALC: 42.9 %
HGB BLD-MCNC: 13.5 G/DL
MCHC RBC-ENTMCNC: 30.9 PG
MCHC RBC-ENTMCNC: 31.5 G/DL
MCV RBC AUTO: 98.2 FL
PLATELET # BLD AUTO: 222 K/UL
RBC # BLD: 4.37 M/UL
RBC # FLD: 13.5 %
WBC # FLD AUTO: 6.32 K/UL

## 2025-08-01 ENCOUNTER — NON-APPOINTMENT (OUTPATIENT)
Age: 25
End: 2025-08-01

## 2025-08-08 ENCOUNTER — APPOINTMENT (OUTPATIENT)
Dept: CARDIOLOGY | Facility: CLINIC | Age: 25
End: 2025-08-08
Payer: COMMERCIAL

## 2025-08-08 ENCOUNTER — NON-APPOINTMENT (OUTPATIENT)
Age: 25
End: 2025-08-08

## 2025-08-08 PROCEDURE — 93015 CV STRESS TEST SUPVJ I&R: CPT

## 2025-08-08 PROCEDURE — 93306 TTE W/DOPPLER COMPLETE: CPT

## 2025-08-11 ENCOUNTER — TRANSCRIPTION ENCOUNTER (OUTPATIENT)
Age: 25
End: 2025-08-11